# Patient Record
Sex: MALE | Race: WHITE | URBAN - METROPOLITAN AREA
[De-identification: names, ages, dates, MRNs, and addresses within clinical notes are randomized per-mention and may not be internally consistent; named-entity substitution may affect disease eponyms.]

---

## 2024-05-09 ENCOUNTER — SEXUAL HEALTH (OUTPATIENT)
Dept: SURGERY | Facility: CLINIC | Age: 39
End: 2024-05-09

## 2024-05-09 ENCOUNTER — DOCUMENTATION (OUTPATIENT)
Dept: SURGERY | Facility: CLINIC | Age: 39
End: 2024-05-09

## 2024-05-09 DIAGNOSIS — Z11.3 SCREENING FOR STD (SEXUALLY TRANSMITTED DISEASE): Primary | ICD-10-CM

## 2024-05-09 DIAGNOSIS — Z20.2 EXPOSURE TO TRICHOMONAS: ICD-10-CM

## 2024-05-09 NOTE — PROGRESS NOTES
Per order, patient given Metronidazole 500 mg po twice a day for 7 days. Medications and instructions reviewed.     Patient agreeable with the plan and acknowledged understanding.    Patient encouraged to return with any new symptoms, new sexual partners and at least once yearly.

## 2024-05-09 NOTE — PROGRESS NOTES
Assessment/Plan:  Urine collected for GC/CT testing.  Blood collected for HIV/syphilis testing.  Recommend safer sex practices including 100% condom use.  Recommend regular STD testing.  Follow up 1 week for results.    Practicing safe sex using a condom for each sexually encounter.  Condoms offer the best protection against STD's by acting as a physical barrier to prevent the exchange of semen, vaginal fluids, and blood between partners.  Condoms are not a 100% effective in protection.    Reducing the number of sexual partners can also help to reduce the risk of the transmission of STD's along with regular STD screening.         Problem List Items Addressed This Visit    None  Visit Diagnoses       Screening for STD (sexually transmitted disease)    -  Primary    Exposure to trichomonas                Metronidazole 500 mg po BID for 7 days.  Discussed potential side effects of medications.  No sex until both partners complete treatment.  No alcohol while taking the medication.  Allergies reviewed- NKDA    Subjective:      Patient ID: Anastacio Rhoades is a 39 y.o. male.    Patient presents to STD clinic for STD screening.  Reports 1 female partner in the last year.  Female partner recently informed him that she tested positive for trichomonas.  Partner received treatment.  Patient denies any burning or pain with urination, urethral discharge, blood in urine, rashes or sores, fever or chills, testicular pain or swelling.        The following portions of the patient's history were reviewed and updated as appropriate: allergies, current medications, past family history, past medical history, past social history, past surgical history, and problem list.    Review of Systems   Constitutional:  Negative for chills, diaphoresis, fatigue and fever.   Gastrointestinal:  Negative for abdominal pain.   Genitourinary:  Negative for decreased urine volume, difficulty urinating, dysuria, frequency, genital sores, hematuria, penile  discharge, penile pain, penile swelling, scrotal swelling, testicular pain and urgency.   Skin:  Negative for rash and wound.   Hematological:  Negative for adenopathy.         Objective:      There were no vitals taken for this visit.         Physical Exam  Nursing note reviewed.   Constitutional:       General: He is not in acute distress.     Appearance: Normal appearance. He is not ill-appearing, toxic-appearing or diaphoretic.   HENT:      Head: Normocephalic and atraumatic.   Eyes:      Conjunctiva/sclera: Conjunctivae normal.   Neurological:      Mental Status: He is alert and oriented to person, place, and time.      Gait: Gait normal.   Psychiatric:         Behavior: Behavior normal.         Thought Content: Thought content normal.         Judgment: Judgment normal.                CHIEF CONCERN(S)        Condom Used: No     Sexual Preference :  Female    Date of Last Sexual Exposure: 5/4/24     # of Partners: Last 30 days 1, Last 90 days 1, and Last Year 1    Sexual Practices: Vaginal      Previously Sexually Transmitted Diseases  Type Date Source of Care Treatment Comment   Denies                         Test Date Results   RPR Has not tested in past    [facility]     GC     CT           1. CURRENT RISK BEHAVIOR(S)    Unprotected sex with multiple/anonymous partners, sex under the influence of drugs or alcohol, and Unprotected sex with a partner(s) with an STD?HIV     PREVIOUS SUCCESSES    Maintained a monogamous relationship with only one partner, Discussed condom use prior to having sex with partner(s), and Other        3. SAFER GOAL BEHAVIOR(S)    Always use condoms to have sex, Practice abstinence, Pre-exposure prophylaxis (PrEP), Practice monogamy, and Get tested if condom breaks/ leaks          4. PERSON ACTION PLAN:    > BARRIERS:    No condom use or discussions    > BENEFITS:    Obtain free condoms from [facility] to decrease STD exposure and Provides a healthier sexual relationship and can reduce STD's        >  ACTION STEPS:      Use condoms at least 50% of the time and steadily increase over time until condom use is 100% of the time, Choose to abstain from sex, Discuss condom use prior to having sex with partner(s), and Get tested is an exposure occurred such as a condom breaks/ leaked          4. REFERRALS:  HIV consent given.

## 2024-05-16 ENCOUNTER — SEXUAL HEALTH (OUTPATIENT)
Dept: SURGERY | Facility: CLINIC | Age: 39
End: 2024-05-16

## 2024-05-16 DIAGNOSIS — Z71.2 ENCOUNTER TO DISCUSS TEST RESULTS: Primary | ICD-10-CM

## 2024-05-16 NOTE — PROGRESS NOTES
Pt given results for HIV/STI testing. Pt results were negative for HIV,CT, GC and RPR. Results reviewed and copies were given to patient.Patient re-educated on safe sex practices.Patient states understanding.Pt encouraged to return with any new symptoms, new sexual partners and at least once yearly.  Per STD clinic protocol client did not need to see provider, he was seen at previous visit and had no symptoms at this time.